# Patient Record
Sex: MALE | Race: WHITE | NOT HISPANIC OR LATINO
[De-identification: names, ages, dates, MRNs, and addresses within clinical notes are randomized per-mention and may not be internally consistent; named-entity substitution may affect disease eponyms.]

---

## 2022-04-06 ENCOUNTER — NON-APPOINTMENT (OUTPATIENT)
Age: 62
End: 2022-04-06

## 2022-04-08 PROBLEM — Z00.00 ENCOUNTER FOR PREVENTIVE HEALTH EXAMINATION: Status: ACTIVE | Noted: 2022-04-08

## 2022-04-13 ENCOUNTER — NON-APPOINTMENT (OUTPATIENT)
Age: 62
End: 2022-04-13

## 2022-04-13 ENCOUNTER — APPOINTMENT (OUTPATIENT)
Dept: ORTHOPEDIC SURGERY | Facility: CLINIC | Age: 62
End: 2022-04-13
Payer: COMMERCIAL

## 2022-04-13 VITALS
DIASTOLIC BLOOD PRESSURE: 80 MMHG | SYSTOLIC BLOOD PRESSURE: 130 MMHG | WEIGHT: 194 LBS | HEIGHT: 74 IN | BODY MASS INDEX: 24.9 KG/M2 | HEART RATE: 54 BPM

## 2022-04-13 PROCEDURE — 99204 OFFICE O/P NEW MOD 45 MIN: CPT

## 2022-04-13 NOTE — END OF VISIT
[FreeTextEntry3] : This note was written by She Brock on 04/13/2022 acting solely as a scribe for Dr. Jc Walker.\par  \par All medical record entries made by the Scribe were at my, Dr. Jc Walker, direction and personally dictated by me on 04/13/2022. I have personally reviewed the chart and agree that the record accurately reflects my personal performance of the history, physical exam, assessment and plan.

## 2022-04-13 NOTE — ADDENDUM
[FreeTextEntry1] : I, She Brock, acted solely as a scribe for Dr. Walker on this date on 04/13/2022.

## 2022-04-13 NOTE — HISTORY OF PRESENT ILLNESS
[Right] : right hand dominant [FreeTextEntry1] : He comes in today for evaluation of a left little finger contracture. He denies pain. He states that he was treated with a Xiaflex injection 3 years ago which was successful up until about 6 months ago. He states he knows surgery is inevitable but he would like to hold off and attempt another Xiaflex injection. \par \par He is status post right little finger Dupuytren's fasciectomy by another surgeon more recently.  He states the flexion contracture to the right little finger was never fully resolved even after the procedure. \par \par He is a farmer.

## 2022-04-13 NOTE — DISCUSSION/SUMMARY
[FreeTextEntry1] : He has findings consistent with a severe left little finger contracture secondary to Dupuytren's disease.  He is status post a Xiaflex injection approximately 3 years ago by another physician with recurrent contracture.  He also is status post right little finger Dupuytren's fasciectomy by another physician with a recurrent PIP joint flexion contracture.\par \par I had a discussion with the patient regarding today's visit, the prognosis of this diagnosis, and treatment recommendations and options. At this time, we discussed treatment options of a fasciectomy versus a Xiaflex injection. Given that the patient would like to wait to undergo a fasciectomy, he would like to proceed with a repeat Xiaflex injection with regard to the left little finger contracture.  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  I also discussed the possibility that the Xiaflex injection may not be successful and that if it is not, then further treatment, including surgery, may be necessary.  We also discussed the chance of recurrence of the Dupuytren's disease and contracture.  He does understand that he has a severe contracture and it is unlikely that full extension will be achieved.\par \par He has agreed to the above plan of management and has expressed full understanding.  All questions were fully answered to their satisfaction. \par \par My cumulative time spent on this visit included: Preparation for the visit, review of the medical records, review of pertinent diagnostic studies, examination and counseling of the patient on the above diagnosis, treatment plan and prognosis, orders of diagnostic tests, medication and/or appropriate procedures and documentation in the medical records of today's visit.

## 2022-04-13 NOTE — PHYSICAL EXAM
[de-identified] : - Constitutional: This is a male in no obvious distress.  \par - Psych: Patient is alert and oriented to person, place and time.  Patient has a normal mood and affect.\par - Cardiovascular: Normal pulses throughout the upper extremities.  No significant varicosities are noted in the upper extremities. \par - Neuro: Strength and sensation are intact throughout the upper extremities.  Patient has normal coordination.\par - Respiratory:  Patient exhibits no evidence of shortness of breath or difficulty breathing.\par - Skin: No rashes, lesions, or other abnormalities are noted in the upper extremities.\par \par --- \par \par Examination of his left little finger demonstrates a severe Dupuytren's contracture.  He has a 55 degree MCP joint flexion contracture under 75 degree PIP joint flexion contracture.  There is a longitudinal cord.  There is callus as well.  He has limited flexion at the DIP joint but his FDP tendon is intact.  He is neurovascularly intact distally.\par \par Examination of his right little finger demonstrates a well-healed incision.  He has recurrent Dupuytren's disease with no contracture at the MCP joint but a 65 degree PIP joint flexion contracture.  He is neurovascularly intact distally.

## 2022-10-11 ENCOUNTER — NON-APPOINTMENT (OUTPATIENT)
Age: 62
End: 2022-10-11

## 2022-10-19 ENCOUNTER — APPOINTMENT (OUTPATIENT)
Dept: ORTHOPEDIC SURGERY | Facility: CLINIC | Age: 62
End: 2022-10-19

## 2022-10-19 DIAGNOSIS — Z01.818 ENCOUNTER FOR OTHER PREPROCEDURAL EXAMINATION: ICD-10-CM

## 2022-10-19 PROCEDURE — 99214 OFFICE O/P EST MOD 30 MIN: CPT

## 2022-10-19 NOTE — PHYSICAL EXAM
[de-identified] : - Constitutional: This is a male in no obvious distress.  \par - Psych: Patient is alert and oriented to person, place and time.  Patient has a normal mood and affect.\par - Cardiovascular: Normal pulses throughout the upper extremities.  No significant varicosities are noted in the upper extremities. \par - Neuro: Strength and sensation are intact throughout the upper extremities.  Patient has normal coordination.\par - Respiratory:  Patient exhibits no evidence of shortness of breath or difficulty breathing.\par - Skin: No rashes, lesions, or other abnormalities are noted in the upper extremities.\par \par --- \par \par Examination of his left little finger demonstrates a severe Dupuytren's contracture.  He has a 60 degree MCP joint flexion contracture and a 90 degree PIP joint flexion contracture.  There is a longitudinal cord.  There is callus as well.  He has limited flexion at the DIP joint but his FDP tendon is intact.  He remains neurovascularly intact distally.\par \par Examination of his right little finger demonstrates a well-healed incision.  He has recurrent Dupuytren's disease with no contracture at the MCP joint but a 65 degree PIP joint flexion contracture.  He is neurovascularly intact distally.

## 2022-10-19 NOTE — HISTORY OF PRESENT ILLNESS
[FreeTextEntry1] : Follow-up regarding severe left little finger contracture secondary to Dupuytren's disease.  He is status post a Xiaflex injection greater than 3 years ago by another physician with a recurrent contracture.  He also is status post right little finger Dupuytren's fasciectomy by another physician with a recurrent PIP joint flexion contracture\par \par See note from when he was seen in the office greater than 6 months ago.  We discussed either surgery or Xiaflex injection.\par \par He returns today and would like to discuss surgery. He has a notable flexion contracture at the left little finger.\par \par He is a farmer.

## 2022-10-19 NOTE — ADDENDUM
[FreeTextEntry1] : I, She Brock, acted solely as a scribe for Dr. Walker on this date on 10/19/2022.

## 2022-10-19 NOTE — DISCUSSION/SUMMARY
[FreeTextEntry1] : I had a discussion regarding today's visit, the diagnosis and treatment recommendations and options.  We also discussed changes since the last visit.  At this time, we again discussed the nature and etiology of Dupuytren's disease. Despite that he had initially opted to proceed with a repeat Xiaflex injection, his insurance company denied approval. He would therefore like to proceed with a partial fasciectomy of the left little finger given he is impeded with activities of daily living due to his symptoms. He understands that given the severity of his flexion contracture, there is no guarantee that the outcome of the procedure will be "perfect" but that it should markedly improve his symptoms and function of his hand. He also understands that he may require local skin grafting. He will speak with our surgical schedular and be scheduled for surgery in the near future when it is convenient for him, likely in December. \par \par -  The nature and purposes of the operation/procedure was discussed in detail.  I discussed the surgical procedure in detail, as well as expected postoperative recovery and outcome.\par -  Possible risks, benefits, and complications (from known and unknown causes) of the procedure were discussed in detail.  \par -  Possible non-operative alternatives to the proposed treatment were discussed in detail.  \par -  He was told that possible risks/complications include, but are not limited to:  Infection, digital nerve or vessel injury, stiffness, painful scar, poor outcome, need for additional surgical procedures, and other unforeseen complications.  \par -  In addition, the possibility of an "unsuccessful outcome," despite "successful surgery," was discussed with him.  Given the severity of his contracture, he is at high risk for digital nerve injury.  In addition, he understands that it is unlikely that the finger will likely be able to be fully straighten out, as he likely has an intrinsic PIP joint flexion contracture.  He understands a possible need for skin graft as well as if the finger cannot get straightened out, I discussed possible further treatment, possibly a digit widget.  He also understands the risks of infection and skin breakdown or other associated risks given the severity of his Dupuytren's disease.\par -  The patient fully understands these risks and wishes to proceed.  \par -  I had a lengthy discussion with the patient regarding today's visit, the diagnosis, and my surgical treatment recommendations.  The patient has agreed to this plan of management and has expressed full understanding.  All questions were fully answered to the patient's satisfaction. \par \par My cumulative time spent on today's visit was greater than 30 minutes and included: Preparation for the visit, review of the medical records, review of pertinent diagnostic studies, examination and counseling of the patient on the above diagnosis, treatment plan and prognosis, orders of diagnostic tests, medications and/or appropriate procedures and documentation in the medical records of today's visit.

## 2022-10-19 NOTE — END OF VISIT
[FreeTextEntry3] : This note was written by She Brock on 10/19/2022 acting solely as a scribe for Dr. Jc Walker.\par  \par All medical record entries made by the Scribe were at my, Dr. Jc Walker, direction and personally dictated by me on 10/19/2022. I have personally reviewed the chart and agree that the record accurately reflects my personal performance of the history, physical exam, assessment and plan.

## 2022-10-20 ENCOUNTER — TRANSCRIPTION ENCOUNTER (OUTPATIENT)
Age: 62
End: 2022-10-20

## 2022-11-03 ENCOUNTER — OUTPATIENT (OUTPATIENT)
Dept: OUTPATIENT SERVICES | Facility: HOSPITAL | Age: 62
LOS: 1 days | End: 2022-11-03
Payer: COMMERCIAL

## 2022-11-03 VITALS
SYSTOLIC BLOOD PRESSURE: 151 MMHG | DIASTOLIC BLOOD PRESSURE: 80 MMHG | HEART RATE: 60 BPM | OXYGEN SATURATION: 99 % | HEIGHT: 74 IN | TEMPERATURE: 98 F | WEIGHT: 197.98 LBS | RESPIRATION RATE: 15 BRPM

## 2022-11-03 DIAGNOSIS — Z01.818 ENCOUNTER FOR OTHER PREPROCEDURAL EXAMINATION: ICD-10-CM

## 2022-11-03 DIAGNOSIS — M72.0 PALMAR FASCIAL FIBROMATOSIS [DUPUYTREN]: ICD-10-CM

## 2022-11-03 DIAGNOSIS — M72.0 PALMAR FASCIAL FIBROMATOSIS [DUPUYTREN]: Chronic | ICD-10-CM

## 2022-11-03 LAB
ANION GAP SERPL CALC-SCNC: 5 MMOL/L — SIGNIFICANT CHANGE UP (ref 5–17)
BUN SERPL-MCNC: 12 MG/DL — SIGNIFICANT CHANGE UP (ref 7–23)
CALCIUM SERPL-MCNC: 9 MG/DL — SIGNIFICANT CHANGE UP (ref 8.4–10.5)
CHLORIDE SERPL-SCNC: 103 MMOL/L — SIGNIFICANT CHANGE UP (ref 96–108)
CO2 SERPL-SCNC: 30 MMOL/L — SIGNIFICANT CHANGE UP (ref 22–31)
CREAT SERPL-MCNC: 1.03 MG/DL — SIGNIFICANT CHANGE UP (ref 0.5–1.3)
EGFR: 81 ML/MIN/1.73M2 — SIGNIFICANT CHANGE UP
GLUCOSE SERPL-MCNC: 102 MG/DL — HIGH (ref 70–99)
HCT VFR BLD CALC: 44.1 % — SIGNIFICANT CHANGE UP (ref 39–50)
HGB BLD-MCNC: 14.9 G/DL — SIGNIFICANT CHANGE UP (ref 13–17)
MCHC RBC-ENTMCNC: 30.8 PG — SIGNIFICANT CHANGE UP (ref 27–34)
MCHC RBC-ENTMCNC: 33.8 GM/DL — SIGNIFICANT CHANGE UP (ref 32–36)
MCV RBC AUTO: 91.1 FL — SIGNIFICANT CHANGE UP (ref 80–100)
NRBC # BLD: 0 /100 WBCS — SIGNIFICANT CHANGE UP (ref 0–0)
PLATELET # BLD AUTO: 251 K/UL — SIGNIFICANT CHANGE UP (ref 150–400)
POTASSIUM SERPL-MCNC: 4.7 MMOL/L — SIGNIFICANT CHANGE UP (ref 3.5–5.3)
POTASSIUM SERPL-SCNC: 4.7 MMOL/L — SIGNIFICANT CHANGE UP (ref 3.5–5.3)
RBC # BLD: 4.84 M/UL — SIGNIFICANT CHANGE UP (ref 4.2–5.8)
RBC # FLD: 12.2 % — SIGNIFICANT CHANGE UP (ref 10.3–14.5)
SODIUM SERPL-SCNC: 138 MMOL/L — SIGNIFICANT CHANGE UP (ref 135–145)
WBC # BLD: 4.75 K/UL — SIGNIFICANT CHANGE UP (ref 3.8–10.5)
WBC # FLD AUTO: 4.75 K/UL — SIGNIFICANT CHANGE UP (ref 3.8–10.5)

## 2022-11-03 PROCEDURE — G0463: CPT

## 2022-11-03 PROCEDURE — 93005 ELECTROCARDIOGRAM TRACING: CPT

## 2022-11-03 PROCEDURE — 80048 BASIC METABOLIC PNL TOTAL CA: CPT

## 2022-11-03 PROCEDURE — 93010 ELECTROCARDIOGRAM REPORT: CPT

## 2022-11-03 PROCEDURE — 36415 COLL VENOUS BLD VENIPUNCTURE: CPT

## 2022-11-03 PROCEDURE — 85027 COMPLETE CBC AUTOMATED: CPT

## 2022-11-03 NOTE — H&P PST ADULT - ASSESSMENT
61 y/o male with left little dupuytren contracture  Planned surgery. left little finger Dupuytren's fasciectomy   Will obtain medical clearance  covid testing 11/20/22 0900  Pre op instructions provided  Instructions provided on medications to continue and to take the day morning of surgery

## 2022-11-03 NOTE — H&P PST ADULT - HISTORY OF PRESENT ILLNESS
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 Not taking any pain meds at this time. NO ROM to left little finger.

## 2022-11-03 NOTE — H&P PST ADULT - SPO2 (%)
99 Suturegard Body: The suture ends were repeatedly re-tightened and re-clamped to achieve the desired tissue expansion.

## 2022-11-14 ENCOUNTER — NON-APPOINTMENT (OUTPATIENT)
Age: 62
End: 2022-11-14

## 2022-11-20 ENCOUNTER — OUTPATIENT (OUTPATIENT)
Dept: OUTPATIENT SERVICES | Facility: HOSPITAL | Age: 62
LOS: 1 days | End: 2022-11-20
Payer: COMMERCIAL

## 2022-11-20 DIAGNOSIS — M72.0 PALMAR FASCIAL FIBROMATOSIS [DUPUYTREN]: Chronic | ICD-10-CM

## 2022-11-20 DIAGNOSIS — Z20.828 CONTACT WITH AND (SUSPECTED) EXPOSURE TO OTHER VIRAL COMMUNICABLE DISEASES: ICD-10-CM

## 2022-11-20 LAB — SARS-COV-2 RNA SPEC QL NAA+PROBE: SIGNIFICANT CHANGE UP

## 2022-11-20 PROCEDURE — U0003: CPT

## 2022-11-20 PROCEDURE — U0005: CPT

## 2022-11-21 ENCOUNTER — TRANSCRIPTION ENCOUNTER (OUTPATIENT)
Age: 62
End: 2022-11-21

## 2022-11-21 ENCOUNTER — RESULT REVIEW (OUTPATIENT)
Age: 62
End: 2022-11-21

## 2022-11-22 ENCOUNTER — APPOINTMENT (OUTPATIENT)
Dept: ORTHOPEDIC SURGERY | Facility: HOSPITAL | Age: 62
End: 2022-11-22

## 2022-11-22 ENCOUNTER — OUTPATIENT (OUTPATIENT)
Dept: OUTPATIENT SERVICES | Facility: HOSPITAL | Age: 62
LOS: 1 days | End: 2022-11-22
Payer: COMMERCIAL

## 2022-11-22 ENCOUNTER — TRANSCRIPTION ENCOUNTER (OUTPATIENT)
Age: 62
End: 2022-11-22

## 2022-11-22 VITALS
SYSTOLIC BLOOD PRESSURE: 136 MMHG | TEMPERATURE: 98 F | WEIGHT: 198.86 LBS | DIASTOLIC BLOOD PRESSURE: 73 MMHG | OXYGEN SATURATION: 100 % | RESPIRATION RATE: 15 BRPM | HEIGHT: 74 IN | HEART RATE: 60 BPM

## 2022-11-22 VITALS
OXYGEN SATURATION: 96 % | DIASTOLIC BLOOD PRESSURE: 68 MMHG | HEART RATE: 64 BPM | RESPIRATION RATE: 15 BRPM | SYSTOLIC BLOOD PRESSURE: 139 MMHG

## 2022-11-22 DIAGNOSIS — M72.0 PALMAR FASCIAL FIBROMATOSIS [DUPUYTREN]: Chronic | ICD-10-CM

## 2022-11-22 DIAGNOSIS — M72.0 PALMAR FASCIAL FIBROMATOSIS [DUPUYTREN]: ICD-10-CM

## 2022-11-22 PROCEDURE — ZZZZZ: CPT

## 2022-11-22 PROCEDURE — 26123 RELEASE PALM CONTRACTURE: CPT | Mod: F4

## 2022-11-22 PROCEDURE — 88304 TISSUE EXAM BY PATHOLOGIST: CPT | Mod: 26

## 2022-11-22 PROCEDURE — 88304 TISSUE EXAM BY PATHOLOGIST: CPT

## 2022-11-22 RX ORDER — APREPITANT 80 MG/1
40 CAPSULE ORAL ONCE
Refills: 0 | Status: COMPLETED | OUTPATIENT
Start: 2022-11-22 | End: 2022-11-22

## 2022-11-22 RX ORDER — POLYETHYLENE GLYCOL 3350 17 G/17G
1 POWDER, FOR SOLUTION ORAL
Qty: 0 | Refills: 0 | DISCHARGE

## 2022-11-22 RX ORDER — CHLORHEXIDINE GLUCONATE 213 G/1000ML
1 SOLUTION TOPICAL ONCE
Refills: 0 | Status: COMPLETED | OUTPATIENT
Start: 2022-11-22 | End: 2022-11-22

## 2022-11-22 RX ORDER — HYDROMORPHONE HYDROCHLORIDE 2 MG/ML
0.5 INJECTION INTRAMUSCULAR; INTRAVENOUS; SUBCUTANEOUS
Refills: 0 | Status: DISCONTINUED | OUTPATIENT
Start: 2022-11-22 | End: 2022-11-23

## 2022-11-22 RX ORDER — HYDROMORPHONE HYDROCHLORIDE 2 MG/ML
0.25 INJECTION INTRAMUSCULAR; INTRAVENOUS; SUBCUTANEOUS
Refills: 0 | Status: DISCONTINUED | OUTPATIENT
Start: 2022-11-22 | End: 2022-11-23

## 2022-11-22 RX ORDER — ACETAMINOPHEN 500 MG
2 TABLET ORAL
Qty: 0 | Refills: 0 | DISCHARGE

## 2022-11-22 RX ORDER — ACETAMINOPHEN WITH CODEINE 300MG-30MG
1 TABLET ORAL
Qty: 20 | Refills: 0
Start: 2022-11-22

## 2022-11-22 RX ORDER — OXYCODONE HYDROCHLORIDE 5 MG/1
5 TABLET ORAL ONCE
Refills: 0 | Status: DISCONTINUED | OUTPATIENT
Start: 2022-11-22 | End: 2022-11-23

## 2022-11-22 RX ORDER — ONDANSETRON 8 MG/1
4 TABLET, FILM COATED ORAL ONCE
Refills: 0 | Status: DISCONTINUED | OUTPATIENT
Start: 2022-11-22 | End: 2022-11-23

## 2022-11-22 RX ORDER — SODIUM CHLORIDE 9 MG/ML
1000 INJECTION, SOLUTION INTRAVENOUS
Refills: 0 | Status: DISCONTINUED | OUTPATIENT
Start: 2022-11-22 | End: 2022-11-23

## 2022-11-22 RX ORDER — CEFAZOLIN SODIUM 1 G
2000 VIAL (EA) INJECTION ONCE
Refills: 0 | Status: COMPLETED | OUTPATIENT
Start: 2022-11-22 | End: 2022-11-22

## 2022-11-22 RX ADMIN — CHLORHEXIDINE GLUCONATE 1 APPLICATION(S): 213 SOLUTION TOPICAL at 13:02

## 2022-11-22 RX ADMIN — SODIUM CHLORIDE 75 MILLILITER(S): 9 INJECTION, SOLUTION INTRAVENOUS at 19:19

## 2022-11-22 RX ADMIN — APREPITANT 40 MILLIGRAM(S): 80 CAPSULE ORAL at 13:02

## 2022-11-22 NOTE — ASU DISCHARGE PLAN (ADULT/PEDIATRIC) - ASU DC SPECIAL INSTRUCTIONSFT
Elevate [ Left ] arm in sling when up & walking.  Apply ice paks to Hand/wrist area for 30 min. every 3 hours daily to reduce swelling & pain.  Keep bandage & splint clean & dry daily. Keep intact till you see the Dr.  Cover the bandage & splint on your arm in the shower with a Cast Bag or a plastic garbage bag & tape to seal it so that no water gets in.  Pad the Neck strap of Sling with athletic sock or Collared shirt protect skin of neck.  Take antibiotic starting tonight for Infection prevention.  Call the Surgeon for fever, severe arm/elbow pain, fall or arm injury.  See your Surgeon on Monday 11/28 in [place] for office follow-up visit. Call for appointment.

## 2022-11-22 NOTE — BRIEF OPERATIVE NOTE - NSICDXBRIEFPROCEDURE_GEN_ALL_CORE_FT
PROCEDURES:  Open fasciotomy of palm 22-Nov-2022 19:02:18  Jc Moreno  Full-thickness skin graft to left hand 22-Nov-2022 19:02:40  Jc Moreno

## 2022-11-22 NOTE — ASU DISCHARGE PLAN (ADULT/PEDIATRIC) - CALL YOUR DOCTOR IF YOU HAVE ANY OF THE FOLLOWING:
Swelling that gets worse/Pain not relieved by Medications/Fever greater than (need to indicate Fahrenheit or Celsius)/Wound/Surgical Site with redness, or foul smelling discharge or pus Bleeding that does not stop/Swelling that gets worse/Pain not relieved by Medications/Fever greater than (need to indicate Fahrenheit or Celsius)/Wound/Surgical Site with redness, or foul smelling discharge or pus/Numbness, tingling, color or temperature change to extremity

## 2022-11-22 NOTE — ASU DISCHARGE PLAN (ADULT/PEDIATRIC) - NS MD DC FALL RISK RISK
For information on Fall & Injury Prevention, visit: [email or web address] OR  [email or web address] OR  https://www.cdc.gov/steadi/patient.html

## 2022-11-22 NOTE — ASU PATIENT PROFILE, ADULT - FALL HARM RISK - UNIVERSAL INTERVENTIONS
Bed in lowest position, wheels locked, appropriate side rails in place/Call bell, personal items and telephone in reach/Instruct patient to call for assistance before getting out of bed or chair/Non-slip footwear when patient is out of bed/[place] to call system/Physically safe environment - no spills, clutter or unnecessary equipment/Purposeful Proactive Rounding/Room/bathroom lighting operational, light cord in reach

## 2022-11-22 NOTE — BRIEF OPERATIVE NOTE - COMMENTS
Tourniquet time = 85 Min( PArt A ) + 6 Min.( Part B - Graft [place])  Short wrist & forearm  Ulnar gutter splint  [facility] Ref # [id number]

## 2022-11-22 NOTE — ASU DISCHARGE PLAN (ADULT/PEDIATRIC) - CARE PROVIDER_API CALL
Jc Walker)  Orthopaedic Surgery; Surgery of the Hand  [address], [address]  [place] [address]  Phone: [phone]  Fax: [phone]  Established Patient  Scheduled Appointment: 11/28/2022

## 2022-11-22 NOTE — BRIEF OPERATIVE NOTE - OPERATION/FINDINGS
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

## 2022-11-23 RX ORDER — CEFADROXIL 500 MG/1
500 CAPSULE ORAL
Qty: 4 | Refills: 0 | Status: DISCONTINUED | COMMUNITY
Start: 2022-11-23 | End: 2022-11-23

## 2022-11-23 RX ORDER — CEPHALEXIN 500 MG/1
500 CAPSULE ORAL
Qty: 8 | Refills: 0 | Status: ACTIVE | COMMUNITY
Start: 2022-11-23 | End: 1900-01-01

## 2022-11-28 ENCOUNTER — APPOINTMENT (OUTPATIENT)
Dept: ORTHOPEDIC SURGERY | Facility: CLINIC | Age: 62
End: 2022-11-28

## 2022-11-28 ENCOUNTER — NON-APPOINTMENT (OUTPATIENT)
Age: 62
End: 2022-11-28

## 2022-11-28 PROBLEM — M72.0 PALMAR FASCIAL FIBROMATOSIS [DUPUYTREN]: Chronic | Status: ACTIVE | Noted: 2022-11-03

## 2022-11-28 PROCEDURE — 29125 APPL SHORT ARM SPLINT STATIC: CPT | Mod: LT,58

## 2022-11-28 PROCEDURE — 99024 POSTOP FOLLOW-UP VISIT: CPT

## 2022-11-28 RX ORDER — CEPHALEXIN 500 MG/1
500 TABLET ORAL
Qty: 8 | Refills: 0 | Status: ACTIVE | COMMUNITY
Start: 2022-11-28 | End: 1900-01-01

## 2022-11-28 NOTE — HISTORY OF PRESENT ILLNESS
[de-identified] : 5 days postoperative. [de-identified] : 5 days status post left little finger Dupuytren's fasciectomy and forearm skin graft.  Date of surgery: 11/22/2022.\par \par He is doing well and denies pain. [de-identified] : Examination of his left little finger, hand and forearm after the splint and dressings were removed demonstrates his incisions to be clean and dry.  There is no drainage or evidence of infection.  The skin graft is clean.  He has intact sensation to light touch and pinprick along the ulnar digital nerve with decreased sensation to light touch and pinprick along the radial digital nerve to the little finger with intact sensation to light touch along the ulnar digital nerve to the ring finger. [de-identified] : 5 days postoperative, with decree sensation to light touch along the ulnar digital nerve. [de-identified] : He lives in [facility], and told me that it may be difficult for him to come back next week for the suture removal from the forearm skin graft donor site.  I therefore began removing the central sutures and the incision opened up.  I tried to close this with Steri-Strips, but the incision was gapping so I recommended we suturing it in the office.\par \par Finally, I did discuss with him the numbness along the little finger radial digital nerve.  Both digital nerves were completely dissected out visualized and protected throughout the procedure.  I did tell him that it is possible that this may be a neuropraxic injury to the radial digital nerve, as the PIP joint was flexed down 95 degrees and the MCP joint was flexed down 60 degrees.  I did tell him that this should hopefully improve with time.  He does understand that this can sometimes take upwards of 3 months.  He also understands that it is possible that the nerve may not recover from the the neuropraxic injury to the nerve, given the severity of his contracture and the chronicity.  He told me that he is not practically bothered by this, as he is happy to have his finger out of the flexed position within the palm.\par \par Again, he will follow-up in 4 days.  If he has any problems or concerns before then, then he was instructed to immediately call the office.

## 2022-11-28 NOTE — PROCEDURE
[de-identified] : Using sterile technique, the forearm incision was cleaned.  The area was infiltrated with 1% plain lidocaine.  The incision was then resutured using simple 4-0 silk sutures.  He tolerated the procedure well without complications.\par \par The incision was cleaned and a sterile dressing was applied.  He was placed into a well-padded and well molded left fiberglass ulnar gutter splint with the wrist in neutral position and the little and ring fingers in full extension.\par \par He was instructed on activity modification and elevation.  Because the incision had opened up, he was prescribed 2 days of Keflex 500 mg every 6 hours.  He will follow-up in 4 days for a wound check.

## 2022-12-01 ENCOUNTER — APPOINTMENT (OUTPATIENT)
Dept: ORTHOPEDIC SURGERY | Facility: CLINIC | Age: 62
End: 2022-12-01

## 2022-12-01 ENCOUNTER — NON-APPOINTMENT (OUTPATIENT)
Age: 62
End: 2022-12-01

## 2022-12-01 PROCEDURE — 99024 POSTOP FOLLOW-UP VISIT: CPT

## 2022-12-01 NOTE — HISTORY OF PRESENT ILLNESS
[de-identified] : 9 days postoperative. [de-identified] : 9 days status post left little finger Dupuytren's fasciectomy and forearm skin graft.  Date of surgery: 11/22/2022.\par \par See note from when he was seen in the office 3 days ago.  When I was removing some of the sutures from the forearm incision, the wound opened up.  I had to the suture it with silk sutures.\par \par He is doing well and denies pain. [de-identified] : Examination of his left little finger, hand and forearm after the splint and dressings were removed demonstrates his incisions to be clean and dry.  There is no drainage or evidence of an infection.  His resutured skin graft donor site is clean and dry.  The skin graft is clean.  He has intact sensation to light touch and pinprick along the ulnar digital nerve with decreased sensation to light touch and pinprick along the radial digital nerve to the little finger with intact sensation to light touch along the ulnar digital nerve to the ring finger.  However, he appears to have slightly improved sensation along the radial digital nerve. [de-identified] : Stable, 9 days postoperative. [de-identified] : At this time, a dressing was applied.  Is placed back into the ulnar gutter splint.  He is instructed on splint care and activity modification.  He will see me in 8 days for suture removal.  He will see me before then if he is having any problems or concerns.\par \par Again, with regard to the numbness along the radial digital nerve to the little finger,

## 2022-12-09 ENCOUNTER — NON-APPOINTMENT (OUTPATIENT)
Age: 62
End: 2022-12-09

## 2022-12-09 ENCOUNTER — APPOINTMENT (OUTPATIENT)
Dept: ORTHOPEDIC SURGERY | Facility: CLINIC | Age: 62
End: 2022-12-09

## 2022-12-09 PROCEDURE — 99024 POSTOP FOLLOW-UP VISIT: CPT

## 2022-12-09 NOTE — END OF VISIT
[FreeTextEntry3] : This note was written by She Brock on 12/09/2022 acting solely as a scribe for Dr. Jc Walker.\par  \par All medical record entries made by the Scribe were at my, Dr. Jc Walker, direction and personally dictated by me on 12/09/2022. I have personally reviewed the chart and agree that the record accurately reflects my personal performance of the history, physical exam, assessment and plan.

## 2022-12-09 NOTE — ADDENDUM
[FreeTextEntry1] : I, She Brock, acted solely as a scribe for Dr. Walker on this date on 12/09/2022.

## 2022-12-09 NOTE — HISTORY OF PRESENT ILLNESS
[de-identified] : 17 days postoperative. [de-identified] : 17 days status post left little finger Dupuytren's fasciectomy and forearm skin graft.  Date of surgery: 11/22/2022.\par \par See prior notes.\par \par He reports to be doing well overall.  [de-identified] : Examination of his left little finger, hand and forearm after the splint and dressings were removed demonstrates his incisions to be clean and dry.  There is no drainage or evidence of an infection.  His resutured skin graft donor site is healing well.  The skin graft is clean.  He has intact sensation to light touch and pinprick along the ulnar digital nerve with decreased sensation to light touch and pinprick along the radial digital nerve to the little finger with intact sensation to light touch along the ulnar digital nerve to the ring finger.  However, he appears to have slightly improved sensation along the radial digital nerve. [de-identified] : Stable, 17 days postoperative. [de-identified] : The sutures were removed from the forearm graft incision and Steri-Strips were applied.  He was placed back into the splint.  He will be fitted with a thermoplastic forearm-based ulnar gutter splint in extension. He was referred for hand therapy to be fitted with a custom molded splint. He will follow up in 2 weeks for reevaluation.

## 2022-12-15 ENCOUNTER — NON-APPOINTMENT (OUTPATIENT)
Age: 62
End: 2022-12-15

## 2022-12-23 ENCOUNTER — APPOINTMENT (OUTPATIENT)
Dept: ORTHOPEDIC SURGERY | Facility: CLINIC | Age: 62
End: 2022-12-23

## 2022-12-23 PROCEDURE — 99024 POSTOP FOLLOW-UP VISIT: CPT

## 2023-01-04 PROBLEM — M72.0 DUPUYTREN'S DISEASE OF PALM OF BOTH HANDS: Status: ACTIVE | Noted: 2022-04-13

## 2023-01-04 NOTE — ADDENDUM
[FreeTextEntry1] : I, She Brock, acted solely as a scribe for Dr. Walker on this date on 12/23/2022.

## 2023-01-04 NOTE — HISTORY OF PRESENT ILLNESS
[de-identified] : 31 days postoperative. [de-identified] : 31 days status post left little finger Dupuytren's fasciectomy and forearm skin graft.  Date of surgery: 11/22/2022.\par \par He reports overall he is improving. With that being said, he does not some flexion at the left little finger PIP joint. He has not yet began to wash the hand and utilizes a glove when showering to keep the hand dry. He additionally complains of continued numbness to the little finger.  [de-identified] : Stable, 31 days postoperative. [de-identified] : Examination of his left little finger, hand and forearm demonstrates his incisions to be healing well.  His forearm incision is well-healed.  The graft has taken.  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  He has intact sensation to light touch and pinprick along the ulnar digital nerve with decreased sensation to light touch and pinprick along the radial digital nerve to the little finger with intact sensation to light touch along the ulnar digital nerve to the ring finger.  However, he appears to have slightly improved sensation along the radial digital nerve. [de-identified] : At this time, the scabs along the incision were debrided. He was instructed on local wound care, scar massage and desensitization as well as more aggressive flexion/extension exercises most notably at the left little finger PIP joint. I recommended hand therapy and he was provided with the appropriate referral. He was additionally instructed on use of Coban wrap to the little finger for edema control. Finally, I did tell him that the little finger will not be "normal" given the extent of the flexion contracture he had prior to surgery. He will follow up in 3 weeks. He did ask if he has to absolutely come back for an additional follow up visit given how far of a drive it is for him. I told him that while I understand it is a far drive, ideally I would like to see him once more to most notably reevaluate his left little finger PIP joint.\par \par Finally, I did tell him that hopefully, with time, the numbness along the radial digital nerve should have improved.  It has begun to improve distally and he does have sensation up to the middle phalanx.

## 2023-01-04 NOTE — END OF VISIT
[FreeTextEntry3] : This note was written by She Brock on 12/23/2022 acting solely as a scribe for Dr. Jc Walker.\par  \par All medical record entries made by the Scribe were at my, Dr. Jc Walker, direction and personally dictated by me on 12/23/2022. I have personally reviewed the chart and agree that the record accurately reflects my personal performance of the history, physical exam, assessment and plan.

## 2023-01-11 ENCOUNTER — APPOINTMENT (OUTPATIENT)
Dept: ORTHOPEDIC SURGERY | Facility: CLINIC | Age: 63
End: 2023-01-11
Payer: COMMERCIAL

## 2023-01-11 DIAGNOSIS — M72.0 PALMAR FASCIAL FIBROMATOSIS [DUPUYTREN]: ICD-10-CM

## 2023-01-11 PROCEDURE — 99024 POSTOP FOLLOW-UP VISIT: CPT

## 2023-01-11 NOTE — HISTORY OF PRESENT ILLNESS
[de-identified] : 50 days postoperative. [de-identified] : 50 days status post left little finger Dupuytren's fasciectomy and forearm skin graft.  Date of surgery: 11/22/2022.\par \par He has not yet started hand therapy.\par \par He is improving but does report concern over the fact that he cannot flex the digits all the way down into the palm. As stated, he has not yet began hand therapy as he wanted to be reevaluated by me first. He notes the sensation to the digit is improving with time. [de-identified] : Examination of his left little finger, hand and forearm demonstrates his incisions to be healing well.  His forearm incision is well-healed.  The graft has taken.  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  He has limitation of terminal flexion of the digits into the palm and limited flexion at the little finger DIP joint.  He has intact sensation to light touch and pinprick along the ulnar digital nerve with improved sensation to light touch and pinprick along the radial digital nerve to the little finger with intact sensation to light touch along the ulnar digital nerve to the ring finger.  However, he appears to have slightly improved sensation along the radial digital nerve. [de-identified] : Stable, 50 days postoperative.  He is having some residual stiffness most notably in flexion. [de-identified] : He was instructed to begin hand therapy, as well as on more aggressive flexion and extension exercises and scar massage and desensitization. 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 He will follow-up in 2 months, according to his symptoms.

## (undated) DEVICE — GLV 7 PROTEXIS

## (undated) DEVICE — GLV 7.5 ESTEEM BLUE

## (undated) DEVICE — DRSG ESMARK 4"

## (undated) DEVICE — SUT MONOSOF 5-0 18" P-13

## (undated) DEVICE — DRAPE 3/4 SHEET 52X76"

## (undated) DEVICE — DRSG ACE BANDAGE 4"

## (undated) DEVICE — BLANKET WARMER LOWER ADULT

## (undated) DEVICE — DRAPE SURGICAL #1010

## (undated) DEVICE — CUFF TOURNIQUET 18" DUAL PORT W PLC

## (undated) DEVICE — DRAPE TOWEL BLUE 17" X 24"

## (undated) DEVICE — DRSG WEBRIL 3"

## (undated) DEVICE — NDL HYPO REGULAR BEVEL 25G X 1.5"

## (undated) DEVICE — POSITIONER STRAP ARMBOARD VELCRO TS-30 12

## (undated) DEVICE — POSITIONER FOAM HEAD CRADLE

## (undated) DEVICE — PACK UPPER EXTREMITY

## (undated) DEVICE — WRAP COMPRESSION CALF MED

## (undated) DEVICE — CONTAINER SPECIMEN PET